# Patient Record
Sex: MALE
[De-identification: names, ages, dates, MRNs, and addresses within clinical notes are randomized per-mention and may not be internally consistent; named-entity substitution may affect disease eponyms.]

---

## 2017-11-10 ENCOUNTER — HOSPITAL ENCOUNTER (OUTPATIENT)
Dept: HOSPITAL 31 - C.CATHLAB | Age: 67
Discharge: HOME | End: 2017-11-10
Attending: INTERNAL MEDICINE
Payer: COMMERCIAL

## 2017-11-10 VITALS — BODY MASS INDEX: 25.8 KG/M2

## 2017-11-10 DIAGNOSIS — I67.2: Primary | ICD-10-CM

## 2017-11-10 LAB
APTT BLD: 33 SECONDS (ref 21–34)
BUN SERPL-MCNC: 24 MG/DL (ref 9–20)
CALCIUM SERPL-MCNC: 8.7 MG/DL (ref 8.6–10.4)
CHLORIDE SERPL-SCNC: 101 MMOL/L (ref 98–107)
CO2 SERPL-SCNC: 25 MMOL/L (ref 22–30)
ERYTHROCYTE [DISTWIDTH] IN BLOOD BY AUTOMATED COUNT: 14.4 % (ref 11.5–14.5)
GLUCOSE SERPL-MCNC: 82 MG/DL (ref 75–110)
HCT VFR BLD CALC: 41.2 % (ref 35–51)
INR PPP: 1.1
MCH RBC QN AUTO: 29.4 PG (ref 27–31)
MCHC RBC AUTO-ENTMCNC: 33.7 G/DL (ref 33–37)
MCV RBC AUTO: 87.4 FL (ref 80–94)
PLATELET # BLD: 251 K/UL (ref 130–400)
PMV BLD AUTO: 8.9 FL (ref 7.2–11.7)
POTASSIUM SERPL-SCNC: 4 MMOL/L (ref 3.6–5.2)
SODIUM SERPL-SCNC: 136 MMOL/L (ref 132–148)
WBC # BLD AUTO: 7.6 K/UL (ref 4.8–10.8)

## 2017-11-10 PROCEDURE — 85610 PROTHROMBIN TIME: CPT

## 2017-11-10 PROCEDURE — 85027 COMPLETE CBC AUTOMATED: CPT

## 2017-11-10 PROCEDURE — 85730 THROMBOPLASTIN TIME PARTIAL: CPT

## 2017-11-10 PROCEDURE — 93314 ECHO TRANSESOPHAGEAL: CPT

## 2017-11-10 PROCEDURE — 80048 BASIC METABOLIC PNL TOTAL CA: CPT

## 2017-11-10 PROCEDURE — 36415 COLL VENOUS BLD VENIPUNCTURE: CPT

## 2017-11-10 PROCEDURE — 93312 ECHO TRANSESOPHAGEAL: CPT

## 2017-11-11 NOTE — CARD
--------------- APPROVED REPORT --------------





EXAM: Transesophageal echocardiogram with color flow Doppler w/ 

saline bubbles.



INDICATION

CVA/TIA 

PFO 



Reason For Test : Rule out cardiac source of emboli.



PROCEDURE

After obtaining informed consent, patient underwent transesophageal 

echo in the [facility].

Type of Sedation : Conscious Sedation

Sedation was provided by anesthesiologist.

Sedation was administered by Dr. Cooper. 

Sedation was achieved with   intravenously. 

The NOEMI was performed  complications. 

Throughout the procedure, the blood pressure, pulse oximetry, cardiac 

rhythm, and rate were monitored.

The patient tolerated the procedure without adverse effects. Recovery 

from conscious sedation was uneventful and vital signs were stable.



 LEFT VENTRICLE 

The left ventricle is normal size.

There is mild to moderate concentric left ventricular hypertrophy.

The left ventricular function is normal.

The left ventricular ejection fraction is within the normal range.

No left ventricle thrombus noted on this study.

There is no ventricular septal defect visualized.

There is no left ventricular aneurysm. 



 RIGHT VENTRICLE 

The right ventricle is normal size.

The right ventricular systolic function is normal.



 ATRIA 

The left atrium size is normal. Left Atrial appendage free of thrombus

The right atrium size is normal.

Small PFO noted. No bubble cross over inspite of multpile injctions 

and Valsalva



 AORTIC VALVE 

Aortic valve Trileaflet

No aortic regurgitation is present.

There is no aortic valvular stenosis. 

There is no aortic valvular vegetation.



 MITRAL VALVE 

Mitral annular calcification is mild to moderate.

The mitral valve leaflets are mildly thickened

There is no mitral valve stenosis.

Mitral regurgitation is mild to moderate.



 TRICUSPID VALVE 

The tricuspid valve is normal in structure.

There is mild tricuspid regurgitation.

There is no tricuspid valve prolapse or vegetation.

There is no tricuspid valve stenosis. 



 PULMONIC VALVE 

The pulmonary valve is normal in structure.



 GREAT VESSELS 

The aortic root is normal in size.



<Conclusion>

The left ventricular function is normal.

The left ventricular ejection fraction is within the normal range.

The right ventricular systolic function is normal.

Mitral annular calcification is mild to moderate.

The mitral valve leaflets are mildly thickened

Mitral regurgitation is mild to moderate.

The left atrium size is normal. Left Atrial appendage free of thrombus

Small PFO noted. No bubble cross over inspite of multpile injctions 

and Valsalva

## 2018-02-16 ENCOUNTER — HOSPITAL ENCOUNTER (OUTPATIENT)
Dept: HOSPITAL 31 - C.SPRAD | Age: 68
Discharge: HOME | End: 2018-02-16
Attending: INTERNAL MEDICINE
Payer: COMMERCIAL

## 2018-02-16 VITALS — BODY MASS INDEX: 25 KG/M2

## 2018-02-16 DIAGNOSIS — R00.2: Primary | ICD-10-CM

## 2018-02-16 DIAGNOSIS — I67.2: ICD-10-CM

## 2018-02-16 PROCEDURE — 93312 ECHO TRANSESOPHAGEAL: CPT

## 2018-02-17 NOTE — CARD
--------------- APPROVED REPORT --------------





EXAM: Two-dimensional and M-mode echocardiogram with Doppler and 

color Doppler.



INDICATION

CVA/TIA 

Palpitations 



Mitral Valve

E/A ratio0.0



TDI

E/Lateral E'0.0E/Medial E'0.0



Reason For Test : Rule out cardiac source of emboli.



PROCEDURE

After obtaining informed consent, patient underwent transesophageal 

echo in the Cath lab

Type of Sedation : Conscious Sedation

Sedation was provided by anesthesiologist.

Sedation was achieved with   intravenously. 

The NOEMI was performed  complications. 

Throughout the procedure, the blood pressure, pulse oximetry, cardiac 

rhythm, and rate were monitored.

The patient tolerated the procedure without adverse effects. Recovery 

from conscious sedation was uneventful and vital signs were stable.



 LEFT VENTRICLE 

The left ventricle is normal size.

The left ventricular function is normal.

The left ventricular ejection fraction is within the normal range.

There is normal LV segmental wall motion.

No left ventricle thrombus noted on this study.

There is no ventricular septal defect visualized.



 RIGHT VENTRICLE 

The right ventricle is normal size.

The right ventricular systolic function is normal.



 ATRIA 

The left atrium size is normal.

The right atrium size is normal.

Small PFO. There is no bubble cross over from right to left atrium



 AORTIC VALVE 

The aortic valve is normal in structure.

No aortic regurgitation is present.

There is no aortic valvular stenosis. 

There is no aortic valvular vegetation.



 MITRAL VALVE 

Mitral annular calcification

There is no evidence of mitral valve prolapse.

There is no mitral valve stenosis.

Moderate MR



 TRICUSPID VALVE 

The tricuspid valve is normal in structure.

There is mild tricuspid regurgitation.

There is no tricuspid valve prolapse or vegetation.

There is no tricuspid valve stenosis. 



 PULMONIC VALVE 

The pulmonary valve is normal in structure.

There is no pulmonic valvular regurgitation. 

There is no pulmonic valvular stenosis.



 GREAT VESSELS 

The aortic root is normal in size.



<Conclusion>

NOEMI findings similar to previous study as described

This time bubbles were injected for Right Common Femoral Vein

Small PFO and no bibble cross over from Right to Left

No cardiac source of emboli found